# Patient Record
Sex: FEMALE | Race: WHITE | NOT HISPANIC OR LATINO | URBAN - METROPOLITAN AREA
[De-identification: names, ages, dates, MRNs, and addresses within clinical notes are randomized per-mention and may not be internally consistent; named-entity substitution may affect disease eponyms.]

---

## 2019-03-17 PROBLEM — 197456007 ACUTE PANCREATITIS: Status: ACTIVE | Noted: 2019-03-17

## 2019-03-21 PROBLEM — 14760008: Status: ACTIVE | Noted: 2019-03-17

## 2022-03-10 ENCOUNTER — TELEPHONE ENCOUNTER (OUTPATIENT)
Dept: URBAN - METROPOLITAN AREA CLINIC 33 | Facility: CLINIC | Age: 52
End: 2022-03-10

## 2022-03-10 ENCOUNTER — LAB OUTSIDE AN ENCOUNTER (OUTPATIENT)
Dept: URBAN - METROPOLITAN AREA CLINIC 33 | Facility: CLINIC | Age: 52
End: 2022-03-10

## 2022-03-10 ENCOUNTER — OFFICE VISIT (OUTPATIENT)
Dept: URBAN - METROPOLITAN AREA CLINIC 33 | Facility: CLINIC | Age: 52
End: 2022-03-10
Payer: COMMERCIAL

## 2022-03-10 VITALS
WEIGHT: 167.6 LBS | DIASTOLIC BLOOD PRESSURE: 60 MMHG | SYSTOLIC BLOOD PRESSURE: 116 MMHG | BODY MASS INDEX: 28.61 KG/M2 | HEIGHT: 64 IN

## 2022-03-10 DIAGNOSIS — R13.19 ESOPHAGEAL DYSPHAGIA: ICD-10-CM

## 2022-03-10 DIAGNOSIS — Z85.841: ICD-10-CM

## 2022-03-10 DIAGNOSIS — K44.9 HIATAL HERNIA: ICD-10-CM

## 2022-03-10 DIAGNOSIS — R12 HEARTBURN: ICD-10-CM

## 2022-03-10 PROCEDURE — 99215 OFFICE O/P EST HI 40 MIN: CPT | Performed by: INTERNAL MEDICINE

## 2022-03-10 RX ORDER — DIPHENOXYLATE HYDROCHLORIDE AND ATROPINE SULFATE 2.5; .025 MG/1; MG/1
1 TABLET AS NEEDED TABLET ORAL
Qty: 30 | Refills: 0 | Status: ON HOLD | COMMUNITY
Start: 2019-03-21

## 2022-03-10 RX ORDER — LAMOTRIGINE 200 MG/1
1 TABLET TABLET ORAL TWICE A DAY
Status: ON HOLD | COMMUNITY

## 2022-03-10 RX ORDER — LORAZEPAM 0.5 MG/1
1 TABLET AS NEEDED TABLET ORAL
Status: ON HOLD | COMMUNITY

## 2022-03-10 RX ORDER — CITALOPRAM 20 MG/1
TABLET, FILM COATED ORAL
Qty: 0 | Refills: 0 | Status: ON HOLD | COMMUNITY
Start: 1900-01-01

## 2022-03-10 RX ORDER — ESOMEPRAZOLE MAGNESIUM 40 MG/1
1 CAPSULE CAPSULE, DELAYED RELEASE ORAL
Qty: 90 | Refills: 1 | OUTPATIENT
Start: 2022-03-10

## 2022-03-10 RX ORDER — METHYLPHENIDATE HYDROCHLORIDE 54 MG/1
TAKE 1 TABLET (54 MG) BY ORAL ROUTE ONCE DAILY IN THE MORNING TABLET, EXTENDED RELEASE ORAL 1
Qty: 0 | Refills: 0 | Status: ON HOLD | COMMUNITY
Start: 1900-01-01

## 2022-03-10 RX ORDER — LAMOTRIGINE 100 MG/1
TABLET ORAL
Qty: 0 | Refills: 0 | Status: ON HOLD | COMMUNITY
Start: 1900-01-01

## 2022-03-10 NOTE — HPI-TODAY'S VISIT:
51 year old female patient presents today for heartburn. Patient states she has been experiencing symptoms for about 6 months. Patient currently taking OTC Tums with some relief of symptoms. She admits taking the Tums at night. Patient describes symptoms as a burning sensation in epigastric area and chest and states it is mostly present  after every meal.  Patient has had associated emesis. Patient denies any associated weight loss.    Patient admits EGD in the past.  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  It seems recently there has been some growth of the tumor. Patient is currently having chemo treatments; about to receive her second chemo tomorrow. Her Oncologist is Dr. Missy Montes De Oca.  Patient is receiving chemo treatments every 3 weeks.   Patient also complaints, after she eats and drinks, feels food and liquids lodges in mid esophagus. Occasionally this is followed by emesis. There is increased cough.  Currently, having BM's once a day. She has a hx of chronic constipation and few months ago constipation was worse.  Patient though is now better.

## 2022-03-10 NOTE — PHYSICAL EXAM HENT:
Head, evidence of prior surgical intervention/craniotomy right side  Face,  Face within normal limits,  Ears,  External ears within normal limits,  Nose/Nasopharynx,  External nose  normal appearance,  nares patent,  no nasal discharge,  Mouth and Throat,  Oral cavity appearance normal,  Lips,  Appearance normal

## 2022-03-16 ENCOUNTER — TELEPHONE ENCOUNTER (OUTPATIENT)
Dept: URBAN - METROPOLITAN AREA CLINIC 36 | Facility: CLINIC | Age: 52
End: 2022-03-16

## 2022-03-17 ENCOUNTER — TELEPHONE ENCOUNTER (OUTPATIENT)
Dept: URBAN - METROPOLITAN AREA CLINIC 35 | Facility: CLINIC | Age: 52
End: 2022-03-17

## 2022-03-23 ENCOUNTER — OFFICE VISIT (OUTPATIENT)
Dept: URBAN - METROPOLITAN AREA SURGERY CENTER 8 | Facility: SURGERY CENTER | Age: 52
End: 2022-03-23
Payer: COMMERCIAL

## 2022-03-23 ENCOUNTER — CLAIMS CREATED FROM THE CLAIM WINDOW (OUTPATIENT)
Dept: URBAN - METROPOLITAN AREA CLINIC 4 | Facility: CLINIC | Age: 52
End: 2022-03-23
Payer: COMMERCIAL

## 2022-03-23 DIAGNOSIS — K21.00 ALKALINE REFLUX ESOPHAGITIS: ICD-10-CM

## 2022-03-23 DIAGNOSIS — K31.7 BENIGN GASTRIC POLYP: ICD-10-CM

## 2022-03-23 DIAGNOSIS — K21.00 GASTRO-ESOPHAGEAL REFLUX DISEASE WITH ESOPHAGITIS, WITHOUT BLEEDING: ICD-10-CM

## 2022-03-23 DIAGNOSIS — K31.89 ACQUIRED DEFORMITY OF DUODENUM: ICD-10-CM

## 2022-03-23 DIAGNOSIS — K31.7 POLYP OF STOMACH AND DUODENUM: ICD-10-CM

## 2022-03-23 DIAGNOSIS — R12 BURNING REFLUX: ICD-10-CM

## 2022-03-23 DIAGNOSIS — K31.89 FOCAL FOVEOLAR HYPERPLASIA: ICD-10-CM

## 2022-03-23 PROCEDURE — G8907 PT DOC NO EVENTS ON DISCHARG: HCPCS | Performed by: INTERNAL MEDICINE

## 2022-03-23 PROCEDURE — 88305 TISSUE EXAM BY PATHOLOGIST: CPT | Performed by: PATHOLOGY

## 2022-03-23 PROCEDURE — 88312 SPECIAL STAINS GROUP 1: CPT | Performed by: PATHOLOGY

## 2022-03-23 PROCEDURE — 43239 EGD BIOPSY SINGLE/MULTIPLE: CPT | Performed by: INTERNAL MEDICINE

## 2022-04-11 ENCOUNTER — TELEPHONE ENCOUNTER (OUTPATIENT)
Dept: URBAN - METROPOLITAN AREA SURGERY CENTER 8 | Facility: SURGERY CENTER | Age: 52
End: 2022-04-11

## 2022-04-21 ENCOUNTER — OFFICE VISIT (OUTPATIENT)
Dept: URBAN - METROPOLITAN AREA CLINIC 33 | Facility: CLINIC | Age: 52
End: 2022-04-21
Payer: COMMERCIAL

## 2022-04-21 ENCOUNTER — DASHBOARD ENCOUNTERS (OUTPATIENT)
Age: 52
End: 2022-04-21

## 2022-04-21 VITALS
BODY MASS INDEX: 29.09 KG/M2 | HEART RATE: 87 BPM | SYSTOLIC BLOOD PRESSURE: 115 MMHG | WEIGHT: 170.4 LBS | DIASTOLIC BLOOD PRESSURE: 80 MMHG | HEIGHT: 64 IN | OXYGEN SATURATION: 98 %

## 2022-04-21 DIAGNOSIS — K21.00 GASTROESOPHAGEAL REFLUX DISEASE WITH ESOPHAGITIS WITHOUT HEMORRHAGE: ICD-10-CM

## 2022-04-21 DIAGNOSIS — Z85.841: ICD-10-CM

## 2022-04-21 DIAGNOSIS — K44.9 HIATAL HERNIA: ICD-10-CM

## 2022-04-21 PROCEDURE — 99213 OFFICE O/P EST LOW 20 MIN: CPT | Performed by: INTERNAL MEDICINE

## 2022-04-21 RX ORDER — LAMOTRIGINE 100 MG/1
TABLET ORAL
Qty: 0 | Refills: 0 | Status: ON HOLD | COMMUNITY
Start: 1900-01-01

## 2022-04-21 RX ORDER — ESOMEPRAZOLE MAGNESIUM 40 MG/1
1 CAPSULE CAPSULE, DELAYED RELEASE ORAL
Qty: 90 | Refills: 1 | Status: ACTIVE | COMMUNITY
Start: 2022-03-10

## 2022-04-21 RX ORDER — LAMOTRIGINE 200 MG/1
1 TABLET TABLET ORAL TWICE A DAY
Status: ON HOLD | COMMUNITY

## 2022-04-21 RX ORDER — CITALOPRAM 20 MG/1
TABLET, FILM COATED ORAL
Qty: 0 | Refills: 0 | Status: ON HOLD | COMMUNITY
Start: 1900-01-01

## 2022-04-21 RX ORDER — LORAZEPAM 0.5 MG/1
1 TABLET AS NEEDED TABLET ORAL
Status: ON HOLD | COMMUNITY

## 2022-04-21 RX ORDER — DIPHENOXYLATE HYDROCHLORIDE AND ATROPINE SULFATE 2.5; .025 MG/1; MG/1
1 TABLET AS NEEDED TABLET ORAL
Qty: 30 | Refills: 0 | Status: ON HOLD | COMMUNITY
Start: 2019-03-21

## 2022-04-21 RX ORDER — METHYLPHENIDATE HYDROCHLORIDE 54 MG/1
TAKE 1 TABLET (54 MG) BY ORAL ROUTE ONCE DAILY IN THE MORNING TABLET, EXTENDED RELEASE ORAL 1
Qty: 0 | Refills: 0 | Status: ON HOLD | COMMUNITY
Start: 1900-01-01

## 2022-04-21 NOTE — HPI-TODAY'S VISIT:
Patient presents today for follow up to her EGD. Patient had EGD completed on 03.23.2022 without  complications. Patient currently taking Esomeprazole 40 mg with relief of symptoms. Since the procedure patient denies dysphagia, Globus, changes in appetite, and changes in bowel habits. Patient admits/denies any new symptoms at this time.  Findings:  - The Z-line was irregular and was found 30 cm from the incisors. - A 4 cm hiatal hernia was present. - Erosive LA Grade C (one or more mucosal breaks continuous between tops of 2 or more mucosal folds, less than 75% circumference) esophagitis with no bleeding was found in the lower third of the esophagus. Biopsies x 2 were taken with a cold forceps for histology. Only 2 biopsies obtained as it was very difficult to keep esophagus insufflated long enough to obtain biopsies. - The exam of the esophagus was otherwise normal. - There is no endoscopic evidence of stenosis or stricture in the entire esophagus. - Diffuse mildly erythematous mucosa without bleeding was found in the gastric body and in the gastric antrum.  Biopsies were taken with a cold forceps for Helicobacter pylori testing. - Multiple 3 to 6 mm semi-sessile polyps with no bleeding and no stigmata of recent bleeding were found in the gastric fundus and in the gastric body.  Biopsies were taken with a cold forceps for histology. - The exam of the stomach was otherwise normal. - The examined duodenum was normal.   Path: Gastric bx's NL.  Fundic Gland Polyps. Esophagus: severe reflux type changes with acute erosion c/w reflux associated erosive esophagitis. No IM, EOE or malignancy

## 2022-06-08 ENCOUNTER — TELEPHONE ENCOUNTER (OUTPATIENT)
Dept: URBAN - METROPOLITAN AREA CLINIC 36 | Facility: CLINIC | Age: 52
End: 2022-06-08

## 2022-06-08 PROBLEM — 266433003: Status: ACTIVE | Noted: 2022-04-21

## 2022-06-08 RX ORDER — ESOMEPRAZOLE MAGNESIUM 40 MG/1
1 CAPSULE CAPSULE, DELAYED RELEASE ORAL
Qty: 90 | Refills: 1
Start: 2022-03-10

## 2022-08-25 ENCOUNTER — OFFICE VISIT (OUTPATIENT)
Dept: URBAN - METROPOLITAN AREA CLINIC 33 | Facility: CLINIC | Age: 52
End: 2022-08-25

## 2022-08-30 ENCOUNTER — OFFICE VISIT (OUTPATIENT)
Dept: URBAN - METROPOLITAN AREA CLINIC 35 | Facility: CLINIC | Age: 52
End: 2022-08-30

## 2022-08-30 RX ORDER — CITALOPRAM 20 MG/1
TABLET, FILM COATED ORAL
Qty: 0 | Refills: 0 | Status: ON HOLD | COMMUNITY
Start: 1900-01-01

## 2022-08-30 RX ORDER — LAMOTRIGINE 200 MG/1
1 TABLET TABLET ORAL TWICE A DAY
Status: ON HOLD | COMMUNITY

## 2022-08-30 RX ORDER — METHYLPHENIDATE HYDROCHLORIDE 54 MG/1
TAKE 1 TABLET (54 MG) BY ORAL ROUTE ONCE DAILY IN THE MORNING TABLET, EXTENDED RELEASE ORAL 1
Qty: 0 | Refills: 0 | Status: ON HOLD | COMMUNITY
Start: 1900-01-01

## 2022-08-30 RX ORDER — LORAZEPAM 0.5 MG/1
1 TABLET AS NEEDED TABLET ORAL
Status: ON HOLD | COMMUNITY

## 2022-08-30 RX ORDER — LAMOTRIGINE 100 MG/1
TABLET ORAL
Qty: 0 | Refills: 0 | Status: ON HOLD | COMMUNITY
Start: 1900-01-01

## 2022-08-30 RX ORDER — ESOMEPRAZOLE MAGNESIUM 40 MG/1
1 CAPSULE CAPSULE, DELAYED RELEASE ORAL
Qty: 90 | Refills: 1 | Status: ACTIVE | COMMUNITY
Start: 2022-03-10

## 2022-08-30 RX ORDER — DIPHENOXYLATE HYDROCHLORIDE AND ATROPINE SULFATE 2.5; .025 MG/1; MG/1
1 TABLET AS NEEDED TABLET ORAL
Qty: 30 | Refills: 0 | Status: ON HOLD | COMMUNITY
Start: 2019-03-21

## 2022-08-30 NOTE — HPI-TODAY'S VISIT:
52 year old female patient presents today for 4 month follow up  for acid reflux symptoms. Patient admits continued use of Nexium 40 mg with improvement of symptoms. Patient admits she has/ not had any recent flare ups. Denies/ admits taking OTC medications.

## 2024-06-07 ENCOUNTER — CLAIMS CREATED FROM THE CLAIM WINDOW (OUTPATIENT)
Dept: URBAN - METROPOLITAN AREA MEDICAL CENTER 27 | Facility: MEDICAL CENTER | Age: 54
End: 2024-06-07
Payer: COMMERCIAL

## 2024-06-07 DIAGNOSIS — K94.29 GASTRIC TUBE GRANULATION TISSUE: ICD-10-CM

## 2024-06-07 DIAGNOSIS — R13.12 DYSPHAGIA: ICD-10-CM

## 2024-06-07 PROCEDURE — 99244 OFF/OP CNSLTJ NEW/EST MOD 40: CPT | Performed by: INTERNAL MEDICINE

## 2024-06-07 PROCEDURE — 99254 IP/OBS CNSLTJ NEW/EST MOD 60: CPT | Performed by: INTERNAL MEDICINE
